# Patient Record
Sex: MALE | Race: WHITE | NOT HISPANIC OR LATINO | Employment: FULL TIME | URBAN - NONMETROPOLITAN AREA
[De-identification: names, ages, dates, MRNs, and addresses within clinical notes are randomized per-mention and may not be internally consistent; named-entity substitution may affect disease eponyms.]

---

## 2021-07-17 ENCOUNTER — HOSPITAL ENCOUNTER (EMERGENCY)
Facility: HOSPITAL | Age: 26
Discharge: HOME | End: 2021-07-17
Attending: EMERGENCY MEDICINE
Payer: COMMERCIAL

## 2021-07-17 ENCOUNTER — APPOINTMENT (EMERGENCY)
Dept: CT IMAGING | Facility: HOSPITAL | Age: 26
End: 2021-07-17
Payer: COMMERCIAL

## 2021-07-17 VITALS
OXYGEN SATURATION: 96 % | TEMPERATURE: 98.4 F | WEIGHT: 230 LBS | SYSTOLIC BLOOD PRESSURE: 122 MMHG | HEIGHT: 71 IN | RESPIRATION RATE: 18 BRPM | HEART RATE: 95 BPM | BODY MASS INDEX: 32.2 KG/M2 | DIASTOLIC BLOOD PRESSURE: 66 MMHG

## 2021-07-17 DIAGNOSIS — S09.90XA INJURY OF HEAD, INITIAL ENCOUNTER: Primary | ICD-10-CM

## 2021-07-17 DIAGNOSIS — S06.0X9A CONCUSSION: ICD-10-CM

## 2021-07-17 PROCEDURE — 99283 EMERGENCY DEPT VISIT LOW MDM: CPT

## 2021-07-17 PROCEDURE — 70450 CT HEAD/BRAIN W/O DYE: CPT

## 2021-07-17 PROCEDURE — 99284 EMERGENCY DEPT VISIT MOD MDM: CPT | Performed by: PHYSICIAN ASSISTANT

## 2021-07-17 PROCEDURE — 72125 CT NECK SPINE W/O DYE: CPT
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
## 2021-07-17 NOTE — ED PROVIDER NOTES
History  No chief complaint on file  HPI    None       No past medical history on file  No past surgical history on file  No family history on file  I have reviewed and agree with the history as documented  No existing history information found  No existing history information found  Social History     Tobacco Use    Smoking status: Not on file   Substance Use Topics    Alcohol use: Not on file    Drug use: Not on file       Review of Systems    Physical Exam  Physical Exam    Vital Signs  ED Triage Vitals   Temp Pulse Resp BP SpO2   -- -- -- -- --      Temp src Heart Rate Source Patient Position - Orthostatic VS BP Location FiO2 (%)   -- -- -- -- --      Pain Score       --           There were no vitals filed for this visit  Visual Acuity      ED Medications  Medications - No data to display    Diagnostic Studies  Results Reviewed     None                 No orders to display              Procedures  Procedures         ED Course                                           MDM    Disposition  Final diagnoses:   None     ED Disposition     None      Follow-up Information    None         Patient's Medications    No medications on file     No discharge procedures on file      PDMP Review     None          ED Provider  Electronically Signed by
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
## 2021-07-17 NOTE — DISCHARGE INSTRUCTIONS
If you have any new or worsening symptoms please return immediately to the emergency department    Please follow-up with your family doctor, if you do not have a family doctor please follow-up with [facility], number provided on this discharge instructions

## 2021-07-17 NOTE — Clinical Note
Roney Sanchez was seen and treated in our emergency department on 7/17/2021  Diagnosis:     Judith Frey  may return to work on return date  He may return on this date: 07/21/2021         If you have any questions or concerns, please don't hesitate to call        Shayna Nichole PA-C    ______________________________           _______________          _______________  Hospital Representative                              Date                                Time

## 2025-04-23 ENCOUNTER — APPOINTMENT (EMERGENCY)
Dept: CT IMAGING | Facility: HOSPITAL | Age: 30
End: 2025-04-23
Payer: COMMERCIAL

## 2025-04-23 ENCOUNTER — HOSPITAL ENCOUNTER (EMERGENCY)
Facility: HOSPITAL | Age: 30
Discharge: HOME | End: 2025-04-23
Attending: STUDENT IN AN ORGANIZED HEALTH CARE EDUCATION/TRAINING PROGRAM
Payer: COMMERCIAL

## 2025-04-23 VITALS
DIASTOLIC BLOOD PRESSURE: 87 MMHG | SYSTOLIC BLOOD PRESSURE: 137 MMHG | RESPIRATION RATE: 18 BRPM | TEMPERATURE: 97.8 F | HEART RATE: 83 BPM | OXYGEN SATURATION: 98 %

## 2025-04-23 DIAGNOSIS — M51.369 BULGING LUMBAR DISC: Primary | ICD-10-CM

## 2025-04-23 LAB
ALBUMIN SERPL BCG-MCNC: 4.4 G/DL (ref 3.5–5)
ALP SERPL-CCNC: 61 U/L (ref 34–104)
ALT SERPL W P-5'-P-CCNC: 39 U/L (ref 7–52)
ANION GAP SERPL CALCULATED.3IONS-SCNC: 6 MMOL/L (ref 4–13)
AST SERPL W P-5'-P-CCNC: 26 U/L (ref 13–39)
BACTERIA UR QL AUTO: ABNORMAL /HPF
BASOPHILS # BLD AUTO: 0.1 THOUSANDS/ÂΜL (ref 0–0.1)
BASOPHILS NFR BLD AUTO: 1 % (ref 0–1)
BILIRUB SERPL-MCNC: 0.53 MG/DL (ref 0.2–1)
BILIRUB UR QL STRIP: NEGATIVE
BUN SERPL-MCNC: 18 MG/DL (ref 5–25)
CALCIUM SERPL-MCNC: 9.4 MG/DL (ref 8.4–10.2)
CHLORIDE SERPL-SCNC: 105 MMOL/L (ref 96–108)
CLARITY UR: CLEAR
CO2 SERPL-SCNC: 26 MMOL/L (ref 21–32)
COLOR UR: YELLOW
CREAT SERPL-MCNC: 1.33 MG/DL (ref 0.6–1.3)
EOSINOPHIL # BLD AUTO: 0.35 THOUSAND/ÂΜL (ref 0–0.61)
EOSINOPHIL NFR BLD AUTO: 4 % (ref 0–6)
ERYTHROCYTE [DISTWIDTH] IN BLOOD BY AUTOMATED COUNT: 11.8 % (ref 11.6–15.1)
GFR SERPL CREATININE-BSD FRML MDRD: 72 ML/MIN/1.73SQ M
GLUCOSE SERPL-MCNC: 92 MG/DL (ref 65–140)
GLUCOSE UR STRIP-MCNC: NEGATIVE MG/DL
HCT VFR BLD AUTO: 45.5 % (ref 36.5–49.3)
HGB BLD-MCNC: 15.4 G/DL (ref 12–17)
HGB UR QL STRIP.AUTO: ABNORMAL
IMM GRANULOCYTES # BLD AUTO: 0.02 THOUSAND/UL (ref 0–0.2)
IMM GRANULOCYTES NFR BLD AUTO: 0 % (ref 0–2)
KETONES UR STRIP-MCNC: NEGATIVE MG/DL
LEUKOCYTE ESTERASE UR QL STRIP: NEGATIVE
LYMPHOCYTES # BLD AUTO: 1.75 THOUSANDS/ÂΜL (ref 0.6–4.47)
LYMPHOCYTES NFR BLD AUTO: 21 % (ref 14–44)
MCH RBC QN AUTO: 30.3 PG (ref 26.8–34.3)
MCHC RBC AUTO-ENTMCNC: 33.8 G/DL (ref 31.4–37.4)
MCV RBC AUTO: 89 FL (ref 82–98)
MONOCYTES # BLD AUTO: 0.48 THOUSAND/ÂΜL (ref 0.17–1.22)
MONOCYTES NFR BLD AUTO: 6 % (ref 4–12)
NEUTROPHILS # BLD AUTO: 5.6 THOUSANDS/ÂΜL (ref 1.85–7.62)
NEUTS SEG NFR BLD AUTO: 68 % (ref 43–75)
NITRITE UR QL STRIP: NEGATIVE
NON-SQ EPI CELLS URNS QL MICRO: ABNORMAL /HPF
NRBC BLD AUTO-RTO: 0 /100 WBCS
PH UR STRIP.AUTO: 7 [PH]
PLATELET # BLD AUTO: 257 THOUSANDS/UL (ref 149–390)
PMV BLD AUTO: 9.4 FL (ref 8.9–12.7)
POTASSIUM SERPL-SCNC: 4.3 MMOL/L (ref 3.5–5.3)
PROT SERPL-MCNC: 7 G/DL (ref 6.4–8.4)
PROT UR STRIP-MCNC: NEGATIVE MG/DL
RBC # BLD AUTO: 5.09 MILLION/UL (ref 3.88–5.62)
RBC #/AREA URNS AUTO: ABNORMAL /HPF
SODIUM SERPL-SCNC: 137 MMOL/L (ref 135–147)
SP GR UR STRIP.AUTO: 1.01 (ref 1–1.03)
UROBILINOGEN UR QL STRIP.AUTO: 0.2 E.U./DL
WBC # BLD AUTO: 8.3 THOUSAND/UL (ref 4.31–10.16)
WBC #/AREA URNS AUTO: ABNORMAL /HPF

## 2025-04-23 PROCEDURE — 85025 COMPLETE CBC W/AUTO DIFF WBC: CPT | Performed by: PHYSICIAN ASSISTANT

## 2025-04-23 PROCEDURE — 99284 EMERGENCY DEPT VISIT MOD MDM: CPT

## 2025-04-23 PROCEDURE — 81001 URINALYSIS AUTO W/SCOPE: CPT | Performed by: PHYSICIAN ASSISTANT

## 2025-04-23 PROCEDURE — 99285 EMERGENCY DEPT VISIT HI MDM: CPT | Performed by: PHYSICIAN ASSISTANT

## 2025-04-23 PROCEDURE — 96375 TX/PRO/DX INJ NEW DRUG ADDON: CPT

## 2025-04-23 PROCEDURE — 36415 COLL VENOUS BLD VENIPUNCTURE: CPT | Performed by: PHYSICIAN ASSISTANT

## 2025-04-23 PROCEDURE — 80053 COMPREHEN METABOLIC PANEL: CPT | Performed by: PHYSICIAN ASSISTANT

## 2025-04-23 PROCEDURE — 96374 THER/PROPH/DIAG INJ IV PUSH: CPT

## 2025-04-23 PROCEDURE — 72131 CT LUMBAR SPINE W/O DYE: CPT

## 2025-04-23 PROCEDURE — 96361 HYDRATE IV INFUSION ADD-ON: CPT

## 2025-04-23 RX ORDER — DEXAMETHASONE SODIUM PHOSPHATE 10 MG/ML
10 INJECTION, SOLUTION INTRAMUSCULAR; INTRAVENOUS ONCE
Status: COMPLETED | OUTPATIENT
Start: 2025-04-23 | End: 2025-04-23

## 2025-04-23 RX ORDER — PREDNISONE 20 MG/1
TABLET ORAL
Qty: 18 TABLET | Refills: 0 | Status: SHIPPED | OUTPATIENT
Start: 2025-04-23 | End: 2025-05-08

## 2025-04-23 RX ORDER — KETOROLAC TROMETHAMINE 30 MG/ML
15 INJECTION, SOLUTION INTRAMUSCULAR; INTRAVENOUS ONCE
Status: COMPLETED | OUTPATIENT
Start: 2025-04-23 | End: 2025-04-23

## 2025-04-23 RX ORDER — OXYCODONE HYDROCHLORIDE 5 MG/1
5 TABLET ORAL ONCE
Refills: 0 | Status: COMPLETED | OUTPATIENT
Start: 2025-04-23 | End: 2025-04-23

## 2025-04-23 RX ORDER — METHOCARBAMOL 500 MG/1
500 TABLET, FILM COATED ORAL 2 TIMES DAILY
Qty: 20 TABLET | Refills: 0 | Status: SHIPPED | OUTPATIENT
Start: 2025-04-23

## 2025-04-23 RX ORDER — PREDNISONE 20 MG/1
40 TABLET ORAL ONCE
Status: COMPLETED | OUTPATIENT
Start: 2025-04-23 | End: 2025-04-23

## 2025-04-23 RX ORDER — OXYCODONE HYDROCHLORIDE 5 MG/1
5 TABLET ORAL EVERY 6 HOURS PRN
Qty: 12 TABLET | Refills: 0 | Status: SHIPPED | OUTPATIENT
Start: 2025-04-23

## 2025-04-23 RX ORDER — ACETAMINOPHEN 325 MG/1
975 TABLET ORAL ONCE
Status: COMPLETED | OUTPATIENT
Start: 2025-04-23 | End: 2025-04-23

## 2025-04-23 RX ORDER — METHOCARBAMOL 500 MG/1
500 TABLET, FILM COATED ORAL ONCE
Status: COMPLETED | OUTPATIENT
Start: 2025-04-23 | End: 2025-04-23

## 2025-04-23 RX ORDER — HYDROMORPHONE HCL/PF 1 MG/ML
1 SYRINGE (ML) INJECTION ONCE
Refills: 0 | Status: COMPLETED | OUTPATIENT
Start: 2025-04-23 | End: 2025-04-23

## 2025-04-23 RX ADMIN — PREDNISONE 40 MG: 20 TABLET ORAL at 18:45

## 2025-04-23 RX ADMIN — OXYCODONE HYDROCHLORIDE 5 MG: 5 TABLET ORAL at 16:53

## 2025-04-23 RX ADMIN — ACETAMINOPHEN 975 MG: 325 TABLET ORAL at 18:45

## 2025-04-23 RX ADMIN — METHOCARBAMOL 500 MG: 500 TABLET ORAL at 16:53

## 2025-04-23 RX ADMIN — DEXAMETHASONE SODIUM PHOSPHATE 10 MG: 10 INJECTION, SOLUTION INTRAMUSCULAR; INTRAVENOUS at 16:53

## 2025-04-23 RX ADMIN — HYDROMORPHONE HYDROCHLORIDE 1 MG: 1 INJECTION, SOLUTION INTRAMUSCULAR; INTRAVENOUS; SUBCUTANEOUS at 18:47

## 2025-04-23 RX ADMIN — SODIUM CHLORIDE 1000 ML: 0.9 INJECTION, SOLUTION INTRAVENOUS at 16:54

## 2025-04-23 RX ADMIN — KETOROLAC TROMETHAMINE 15 MG: 30 INJECTION, SOLUTION INTRAMUSCULAR; INTRAVENOUS at 16:53

## 2025-04-23 NOTE — Clinical Note
Micheal Mora was seen and treated in our emergency department on 4/23/2025.                Diagnosis:     Micheal  is off the rest of the shift today, may return to work on return date.    He may return on this date: 04/28/2025         If you have any questions or concerns, please don't hesitate to call.      Merrill Suarez PA-C    ______________________________           _______________          _______________  Hospital Representative                              Date                                Time

## 2025-04-24 NOTE — ED PROVIDER NOTES
2020         RE: Africa Dempsey  [address]  [facility] [address]        Dear Colleague,    Thank you for referring your patient, Africa Dempsey, to the [facility]. Please see a copy of my visit note below.    [facility]  SPECIALIZING IN BREAKTHROUGHS  Radiation Oncology    On Treatment Visit Note      Africa Dempsey      Date: 2020   MRN: [id number]   : 1973         Reason for Visit:  On Radiation Treatment Visit     Treatment Summary to Date   Partial brain   1335 cGy/ 4005 cGy   5/15          Subjective:   Doing well. No acute trouble. On decadron 8mg qday. Intermittent headaches, stable overall. No focal neurologic change. Single episode of emesis, none since. Energy adequate. Discontinued keppra due to mood disturbance/ tolerance.     Nursing ROS:        Objective:   /74   Pulse 51   Resp 18   Wt 64.4 kg (142 lb)   SpO2 100%   BMI 21.59 kg/m    No focal neurologic change.     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 She is now undergoing definitive chemo-RT.     Tolerating radiation therapy well.  All questions and concerns addressed.    Plan:   1. Continue current therapy.    2. Continue decadron per medical oncology.      Mosaiq chart and setup information reviewed  Ports checked                Dhruv Palencia MD           "Time reflects when diagnosis was documented in both MDM as applicable and the Disposition within this note       Time User Action Codes Description Comment    4/23/2025  6:38 PM Merrill Suarez Add [M51.369] Bulging lumbar disc           ED Disposition       ED Disposition   Discharge    Condition   Stable    Date/Time   Wed Apr 23, 2025  6:38 PM    Comment   Michealchayo Mora discharge to home/self care.                   Assessment & Plan       Medical Decision Making  The patient is a 29-year-old male who presents with back pain.  The patient states that about an hour ago he was doing his gym routine including dead lifting weights.  States that when he stood up after lifting the weight he had severe pop pain in the low back.  He states that the pain has been severe.  Worse with certain movements.  He denies any lower leg weakness numbness tingling bowel or bladder incontinence fevers chills abdominal pain.  He states that the pain is very severe when he moves which is making it difficult for him to get around since the incident.  The patient did not take anything prior to arrival for pain.  He states that he had some \"back issues\" a few months ago which he had some improvement with seeing the chiropractor.    Patient has 5 out of 5 plantarflexion dorsiflexion of both lower extremities.  Sensation intact and equal.    The patient's blood work obtained was unremarkable patient had CAT scan performed which demonstrated bulging disks.  Patient did not have any focal neurologic deficit but was uncomfortable.  Treated for his symptoms and will follow-up with spine/pain management.  Or can follow-up with his PCP.  Patient agreement treatment plan.    Differential included but not limited to strain,herniated disc, bulging disc, fracture, sciatica, radiculopathy    Amount and/or Complexity of Data Reviewed  Labs: ordered. Decision-making details documented in ED Course.  Radiology: ordered and independent interpretation " performed. Decision-making details documented in ED Course.    Risk  OTC drugs.  Prescription drug management.             Medications   sodium chloride 0.9 % bolus 1,000 mL (0 mL Intravenous Stopped 4/23/25 1848)   ketorolac (TORADOL) injection 15 mg (15 mg Intravenous Given 4/23/25 1653)   methocarbamol (ROBAXIN) tablet 500 mg (500 mg Oral Given 4/23/25 1653)   dexamethasone (PF) (DECADRON) injection 10 mg (10 mg Intravenous Given 4/23/25 1653)   oxyCODONE (ROXICODONE) IR tablet 5 mg (5 mg Oral Given 4/23/25 1653)   HYDROmorphone (DILAUDID) injection 1 mg (1 mg Intravenous Given 4/23/25 1847)   acetaminophen (TYLENOL) tablet 975 mg (975 mg Oral Given 4/23/25 1845)   predniSONE tablet 40 mg (40 mg Oral Given 4/23/25 1845)       ED Risk Strat Scores                    No data recorded        SBIRT 20yo+      Flowsheet Row Most Recent Value   Initial Alcohol Screen: US AUDIT-C     1. How often do you have a drink containing alcohol? 0 Filed at: 04/23/2025 1704   2. How many drinks containing alcohol do you have on a typical day you are drinking?  0 Filed at: 04/23/2025 1704   3a. Male UNDER 65: How often do you have five or more drinks on one occasion? 0 Filed at: 04/23/2025 1704   Audit-C Score 0 Filed at: 04/23/2025 1704   VOLODYMYR: How many times in the past year have you...    Used an illegal drug or used a prescription medication for non-medical reasons? Never Filed at: 04/23/2025 1704                            History of Present Illness       Chief Complaint   Patient presents with    Back Pain     Patient c/o lower back pain since attempting to dead lift. Patient stated he heard a pop and legs became weak.        History reviewed. No pertinent past medical history.   History reviewed. No pertinent surgical history.   History reviewed. No pertinent family history.   Social History     Tobacco Use    Smoking status: Never     Passive exposure: Never    Smokeless tobacco: Never   Vaping Use    Vaping status: Never  "Used   Substance Use Topics    Alcohol use: Not Currently    Drug use: Not Currently      E-Cigarette/Vaping    E-Cigarette Use Never User       E-Cigarette/Vaping Substances      I have reviewed and agree with the history as documented.     The patient is a 29-year-old male who presents with back pain.  The patient states that about an hour ago he was doing his gym routine including dead lifting weights.  States that when he stood up after lifting the weight he had severe pop pain in the low back.  He states that the pain has been severe.  Worse with certain movements.  He denies any lower leg weakness numbness tingling bowel or bladder incontinence fevers chills abdominal pain.  He states that the pain is very severe when he moves which is making it difficult for him to get around since the incident.  The patient did not take anything prior to arrival for pain.  He states that he had some \"back issues\" a few months ago which he had some improvement with seeing the chiropractor.          Review of Systems   All other systems reviewed and are negative.          Objective       ED Triage Vitals   Temperature Pulse Blood Pressure Respirations SpO2 Patient Position - Orthostatic VS   04/23/25 1606 04/23/25 1606 04/23/25 1606 04/23/25 1606 04/23/25 1606 04/23/25 1606   97.8 °F (36.6 °C) 83 137/87 18 98 % Sitting      Temp Source Heart Rate Source BP Location FiO2 (%) Pain Score    04/23/25 1606 04/23/25 1606 04/23/25 1606 -- 04/23/25 1653    Temporal Monitor Left arm  8      Vitals      Date and Time Temp Pulse SpO2 Resp BP Pain Score FACES Pain Rating User   04/23/25 1847 -- -- -- -- -- 7 -- SA   04/23/25 1845 -- -- -- -- -- 7 -- SA   04/23/25 1653 -- -- -- -- -- 8 -- AM   04/23/25 1606 97.8 °F (36.6 °C) 83 98 % 18 137/87 -- -- AFG            Physical Exam  Vitals and nursing note reviewed.   Constitutional:       General: He is in acute distress.      Appearance: He is well-developed.   HENT:      Head: Normocephalic " and atraumatic.   Eyes:      Extraocular Movements: Extraocular movements intact.      Pupils: Pupils are equal, round, and reactive to light.   Cardiovascular:      Rate and Rhythm: Normal rate and regular rhythm.      Heart sounds: No murmur heard.  Pulmonary:      Effort: Pulmonary effort is normal. No respiratory distress.      Breath sounds: Normal breath sounds.   Abdominal:      General: Bowel sounds are normal.      Palpations: Abdomen is soft.      Tenderness: There is no abdominal tenderness.   Musculoskeletal:      Cervical back: Normal range of motion.      Thoracic back: Normal.      Lumbar back: Tenderness and bony tenderness present. Positive right straight leg raise test and positive left straight leg raise test.        Back:    Skin:     General: Skin is warm and dry.      Capillary Refill: Capillary refill takes less than 2 seconds.   Neurological:      Mental Status: He is alert and oriented to person, place, and time.      Sensory: Sensation is intact.      Motor: Motor function is intact.      Coordination: Coordination is intact.      Gait: Gait is intact.      Comments: Patient has 5 out of 5 plantarflexion dorsiflexion of both lower extremities.  Sensation intact and equal.   Psychiatric:         Behavior: Behavior normal.         Results Reviewed       Procedure Component Value Units Date/Time    Urine Microscopic [[id number]]  (Abnormal) Collected: 04/23/25 1652    Lab Status: Final result Specimen: Urine, Clean Catch Updated: 04/23/25 1723     RBC, UA 4-10 /hpf      WBC, UA None Seen /hpf      Epithelial Cells None Seen /hpf      Bacteria, UA Occasional /hpf     Comprehensive metabolic panel [[id number]]  (Abnormal) Collected: 04/23/25 1651    Lab Status: Final result Specimen: Blood from Arm, Right Updated: 04/23/25 1723     Sodium 137 mmol/L      Potassium 4.3 mmol/L      Chloride 105 mmol/L      CO2 26 mmol/L      ANION GAP 6 mmol/L      BUN 18 mg/dL      Creatinine 1.33 mg/dL       Glucose 92 mg/dL      Calcium 9.4 mg/dL      AST 26 U/L      ALT 39 U/L      Alkaline Phosphatase 61 U/L      Total Protein 7.0 g/dL      Albumin 4.4 g/dL      Total Bilirubin 0.53 mg/dL      eGFR 72 ml/min/1.73sq m     Narrative:      National Kid[employer] Foundation guidelines for Chronic Kidney Disease (CKD):     Stage 1 with normal or high GFR (GFR > 90 mL/min/1.73 square meters)    Stage 2 Mild CKD (GFR = 60-89 mL/min/1.73 square meters)    Stage 3A Moderate CKD (GFR = 45-59 mL/min/1.73 square meters)    Stage 3B Moderate CKD (GFR = 30-44 mL/min/1.73 square meters)    Stage 4 Severe CKD (GFR = 15-29 mL/min/1.73 square meters)    Stage 5 End Stage CKD (GFR <15 mL/min/1.73 square meters)  Note: GFR calculation is accurate only with a steady state creatinine    UA w Reflex to Microscopic w Reflex to Culture [[id number]]  (Abnormal) Collected: 04/23/25 1652    Lab Status: Final result Specimen: Urine, Clean Catch Updated: 04/23/25 1711     Color, UA Yellow     Clarity, UA Clear     Specific Gravity, UA 1.010     pH, UA 7.0     Leukocytes, UA Negative     Nitrite, UA Negative     Protein, UA Negative mg/dl      Glucose, UA Negative mg/dl      Ketones, UA Negative mg/dl      Urobilinogen, UA 0.2 E.U./dl      Bilirubin, UA Negative     Occult Blood, UA Moderate    CBC and differential [[id number]] Collected: 04/23/25 1651    Lab Status: Final result Specimen: Blood from Arm, Right Updated: 04/23/25 1706     WBC 8.30 Thousand/uL      RBC 5.09 Million/uL      Hemoglobin 15.4 g/dL      Hematocrit 45.5 %      MCV 89 fL      MCH 30.3 pg      MCHC 33.8 g/dL      RDW 11.8 %      MPV 9.4 fL      Platelets 257 Thousands/uL      nRBC 0 /100 WBCs      Segmented % 68 %      Immature Grans % 0 %      Lymphocytes % 21 %      Monocytes % 6 %      Eosinophils Relative 4 %      Basophils Relative 1 %      Absolute Neutrophils 5.60 Thousands/µL      Absolute Immature Grans 0.02 Thousand/uL      Absolute Lymphocytes 1.75 Thousands/µL       Absolute Monocytes 0.48 Thousand/µL      Eosinophils Absolute 0.35 Thousand/µL      Basophils Absolute 0.10 Thousands/µL             CT spine lumbar without contrast   Final Interpretation by Gamal Flores MD (04/23 1745)      Mild disc degenerative change in the lower lumbar spine. No evidence of disc herniation or fracture.      Workstation performed: [id number]             Procedures    ED Medication and Procedure Management   None     Discharge Medication List as of 4/23/2025  6:41 PM        START taking these medications    Details   methocarbamol (ROBAXIN) 500 mg tablet Take 1 tablet (500 mg total) by mouth 2 (two) times a day, Starting Wed 4/23/2025, Normal      oxyCODONE (Roxicodone) 5 immediate release tablet Take 1 tablet (5 mg total) by mouth every 6 (six) hours as needed for severe pain Max Daily Amount: 20 mg, Starting Wed 4/23/2025, Normal      predniSONE 20 mg tablet Multiple Dosages:Starting Wed 4/23/2025, Until Sun 4/27/2025 at 2359, THEN Starting Mon 4/28/2025, Until Fri 5/2/2025 at 2359, THEN Starting Sat 5/3/2025, Until Wed 5/7/2025 at 2359Take 2 tablets (40 mg total) by mouth daily for 5 days, THEN 1 tablet  (20 mg total) daily for 5 days, THEN 0.5 tablets (10 mg total) daily for 5 days., Normal             ED SEPSIS DOCUMENTATION   Time reflects when diagnosis was documented in both MDM as applicable and the Disposition within this note       Time User Action Codes Description Comment    4/23/2025  6:38 PM Merrill Suarez Add [M51.369] Bulging lumbar disc                  Merrill Suarez PA-C  04/23/25 5625